# Patient Record
(demographics unavailable — no encounter records)

---

## 2024-12-04 NOTE — HISTORY OF PRESENT ILLNESS
[FreeTextEntry1] : melasma [de-identified] : 48 year old female here for melasma of bl cheeks treated previously by outside derm with cosmelan that lead to improvement but recently returned. Endorses history of blood clots.

## 2024-12-04 NOTE — ASSESSMENT
[FreeTextEntry1] : Melasma, Chronic condition, flaring, not at patient's treatment goal - Photos taken for clinical monitoring - Reviewed natural history and chronic nature of condition as well as expectations of treatment  - Strict photoprotection reviewed including sun-protective behaviors, protective clothing, and the daily use / reapplication of OTC TINTED broad-spectrum SPF 30+ sunscreens was advised  - Start compounded rx from [facility]: Hydroquinone 8%, Tretinoin 0.025%, Kojic Acid 1%, Niacinamide 4%, Fluocinolone 0.025% Cream, apply once daily only to affected areas x 3 MOS ONLY. SED including but not limited to inefficacy, irritation, erythema, peeling, unwanted lightening of the skin, paradoxical darkening of the skin (exogenous ochronosis) with prolonged use, atrophy, hypopigmentation, telangiectasias and need for strict sun protection. Cosmetic nature of the medication and out of pocket expense discussed with the patient. If patient develops irritation, instructed to stop medication and present for evaluation. - D\